# Patient Record
Sex: MALE | Race: OTHER | NOT HISPANIC OR LATINO | URBAN - METROPOLITAN AREA
[De-identification: names, ages, dates, MRNs, and addresses within clinical notes are randomized per-mention and may not be internally consistent; named-entity substitution may affect disease eponyms.]

---

## 2019-05-26 ENCOUNTER — EMERGENCY (EMERGENCY)
Facility: HOSPITAL | Age: 59
LOS: 1 days | Discharge: HOME | End: 2019-05-26
Attending: EMERGENCY MEDICINE | Admitting: EMERGENCY MEDICINE
Payer: MEDICAID

## 2019-05-26 VITALS
SYSTOLIC BLOOD PRESSURE: 121 MMHG | OXYGEN SATURATION: 99 % | TEMPERATURE: 98 F | DIASTOLIC BLOOD PRESSURE: 80 MMHG | HEART RATE: 68 BPM | RESPIRATION RATE: 18 BRPM

## 2019-05-26 VITALS
OXYGEN SATURATION: 100 % | RESPIRATION RATE: 17 BRPM | SYSTOLIC BLOOD PRESSURE: 140 MMHG | DIASTOLIC BLOOD PRESSURE: 77 MMHG | HEART RATE: 71 BPM

## 2019-05-26 LAB
ALBUMIN SERPL ELPH-MCNC: 4.3 G/DL — SIGNIFICANT CHANGE UP (ref 3.3–5)
ALP SERPL-CCNC: 78 U/L — SIGNIFICANT CHANGE UP (ref 40–120)
ALT FLD-CCNC: 23 U/L — SIGNIFICANT CHANGE UP (ref 4–41)
ANION GAP SERPL CALC-SCNC: 14 MMO/L — SIGNIFICANT CHANGE UP (ref 7–14)
APPEARANCE UR: CLEAR — SIGNIFICANT CHANGE UP
APTT BLD: 30.3 SEC — SIGNIFICANT CHANGE UP (ref 27.5–36.3)
AST SERPL-CCNC: 18 U/L — SIGNIFICANT CHANGE UP (ref 4–40)
BASOPHILS # BLD AUTO: 0.09 K/UL — SIGNIFICANT CHANGE UP (ref 0–0.2)
BASOPHILS NFR BLD AUTO: 1.4 % — SIGNIFICANT CHANGE UP (ref 0–2)
BILIRUB SERPL-MCNC: 0.5 MG/DL — SIGNIFICANT CHANGE UP (ref 0.2–1.2)
BILIRUB UR-MCNC: NEGATIVE — SIGNIFICANT CHANGE UP
BLOOD UR QL VISUAL: NEGATIVE — SIGNIFICANT CHANGE UP
BUN SERPL-MCNC: 18 MG/DL — SIGNIFICANT CHANGE UP (ref 7–23)
CALCIUM SERPL-MCNC: 9.7 MG/DL — SIGNIFICANT CHANGE UP (ref 8.4–10.5)
CHLORIDE SERPL-SCNC: 104 MMOL/L — SIGNIFICANT CHANGE UP (ref 98–107)
CO2 SERPL-SCNC: 22 MMOL/L — SIGNIFICANT CHANGE UP (ref 22–31)
COLOR SPEC: YELLOW — SIGNIFICANT CHANGE UP
CREAT SERPL-MCNC: 0.8 MG/DL — SIGNIFICANT CHANGE UP (ref 0.5–1.3)
EOSINOPHIL # BLD AUTO: 0.33 K/UL — SIGNIFICANT CHANGE UP (ref 0–0.5)
EOSINOPHIL NFR BLD AUTO: 5.2 % — SIGNIFICANT CHANGE UP (ref 0–6)
GLUCOSE SERPL-MCNC: 104 MG/DL — HIGH (ref 70–99)
GLUCOSE UR-MCNC: >1000 — HIGH
HCT VFR BLD CALC: 45.1 % — SIGNIFICANT CHANGE UP (ref 39–50)
HGB BLD-MCNC: 14.9 G/DL — SIGNIFICANT CHANGE UP (ref 13–17)
IMM GRANULOCYTES NFR BLD AUTO: 0.5 % — SIGNIFICANT CHANGE UP (ref 0–1.5)
INR BLD: 0.97 — SIGNIFICANT CHANGE UP (ref 0.88–1.17)
KETONES UR-MCNC: NEGATIVE — SIGNIFICANT CHANGE UP
LEUKOCYTE ESTERASE UR-ACNC: NEGATIVE — SIGNIFICANT CHANGE UP
LYMPHOCYTES # BLD AUTO: 2.58 K/UL — SIGNIFICANT CHANGE UP (ref 1–3.3)
LYMPHOCYTES # BLD AUTO: 41 % — SIGNIFICANT CHANGE UP (ref 13–44)
MCHC RBC-ENTMCNC: 28.2 PG — SIGNIFICANT CHANGE UP (ref 27–34)
MCHC RBC-ENTMCNC: 33 % — SIGNIFICANT CHANGE UP (ref 32–36)
MCV RBC AUTO: 85.4 FL — SIGNIFICANT CHANGE UP (ref 80–100)
MONOCYTES # BLD AUTO: 0.53 K/UL — SIGNIFICANT CHANGE UP (ref 0–0.9)
MONOCYTES NFR BLD AUTO: 8.4 % — SIGNIFICANT CHANGE UP (ref 2–14)
NEUTROPHILS # BLD AUTO: 2.74 K/UL — SIGNIFICANT CHANGE UP (ref 1.8–7.4)
NEUTROPHILS NFR BLD AUTO: 43.5 % — SIGNIFICANT CHANGE UP (ref 43–77)
NITRITE UR-MCNC: NEGATIVE — SIGNIFICANT CHANGE UP
NRBC # FLD: 0 K/UL — SIGNIFICANT CHANGE UP (ref 0–0)
PH UR: 6 — SIGNIFICANT CHANGE UP (ref 5–8)
PLATELET # BLD AUTO: 362 K/UL — SIGNIFICANT CHANGE UP (ref 150–400)
PMV BLD: 9 FL — SIGNIFICANT CHANGE UP (ref 7–13)
POTASSIUM SERPL-MCNC: 4.5 MMOL/L — SIGNIFICANT CHANGE UP (ref 3.5–5.3)
POTASSIUM SERPL-SCNC: 4.5 MMOL/L — SIGNIFICANT CHANGE UP (ref 3.5–5.3)
PROT SERPL-MCNC: 7.3 G/DL — SIGNIFICANT CHANGE UP (ref 6–8.3)
PROT UR-MCNC: NEGATIVE — SIGNIFICANT CHANGE UP
PROTHROM AB SERPL-ACNC: 10.7 SEC — SIGNIFICANT CHANGE UP (ref 9.8–13.1)
RBC # BLD: 5.28 M/UL — SIGNIFICANT CHANGE UP (ref 4.2–5.8)
RBC # FLD: 13.2 % — SIGNIFICANT CHANGE UP (ref 10.3–14.5)
SODIUM SERPL-SCNC: 140 MMOL/L — SIGNIFICANT CHANGE UP (ref 135–145)
SP GR SPEC: 1.03 — SIGNIFICANT CHANGE UP (ref 1–1.04)
TROPONIN T, HIGH SENSITIVITY: < 6 NG/L — SIGNIFICANT CHANGE UP (ref ?–14)
UROBILINOGEN FLD QL: NORMAL — SIGNIFICANT CHANGE UP
WBC # BLD: 6.3 K/UL — SIGNIFICANT CHANGE UP (ref 3.8–10.5)
WBC # FLD AUTO: 6.3 K/UL — SIGNIFICANT CHANGE UP (ref 3.8–10.5)

## 2019-05-26 PROCEDURE — 70496 CT ANGIOGRAPHY HEAD: CPT | Mod: 26

## 2019-05-26 PROCEDURE — 71046 X-RAY EXAM CHEST 2 VIEWS: CPT | Mod: 26

## 2019-05-26 PROCEDURE — 74176 CT ABD & PELVIS W/O CONTRAST: CPT | Mod: 26

## 2019-05-26 PROCEDURE — 70498 CT ANGIOGRAPHY NECK: CPT | Mod: 26

## 2019-05-26 PROCEDURE — 99285 EMERGENCY DEPT VISIT HI MDM: CPT

## 2019-05-26 RX ORDER — ASPIRIN/CALCIUM CARB/MAGNESIUM 324 MG
325 TABLET ORAL ONCE
Refills: 0 | Status: COMPLETED | OUTPATIENT
Start: 2019-05-26 | End: 2019-05-26

## 2019-05-26 RX ADMIN — Medication 325 MILLIGRAM(S): at 21:22

## 2019-05-26 NOTE — ED PROVIDER NOTE - PROGRESS NOTE DETAILS
Neurology consulted, seeing patient now. Gregory: neuro rec appreciated. cta head and neck- neg.   still having neuropathy likely peripheral but need to r/o cva  admit to cdu for left sided paresthesia r/o cva. asa given Gregory: pt went over to CDU and refused to be in cdu due to Ramadan.  pt neurologically intact.    dx paresthesia.  dc with neuro follow up and pcp. return if symptoms changes or worsens. left ambulatory.  return precautions given.

## 2019-05-26 NOTE — ED PROVIDER NOTE - PHYSICAL EXAMINATION
Gen:  NAD, alert and Oriented  HEENT: Gen:  NAD, alert and Oriented  HEENT:  PERRL, EOMI  Heart:  RRR, no M/R/G  Lung:  CTA b/l, no rales or wheeze  Abd:  Right lateral abdominal tenderness, right CVA tenderness, soft, Obese  Ext:  No edema or joint swelling  Skin:  No rash or ecchymosis  Neuro:  Slight decreased sensation in left V2-distribuation of CN V, otherwise CN II-XII intact.  Decreased sensation left arm and leg.   and arm strength 5/5, left hip flexion strength 4+/5.  Finger to nose intact. Gen:  NAD, alert and Oriented  HEENT:  PERRL, EOMI  Heart:  RRR, no M/R/G  Lung:  CTA b/l, no rales or wheeze  Abd:  Right lateral abdominal tenderness, right CVA tenderness, soft, Obese  Ext:  No edema or joint swelling  Skin:  No rash or ecchymosis.  Radial and DP pulses 2+b/l  Neuro:  Slight decreased sensation in left V2-distribuation of CN V, otherwise CN II-XII intact.  Decreased sensation left arm and leg.   and arm strength 5/5, left hip flexion strength 4+/5.  Finger to nose intact.

## 2019-05-26 NOTE — ED ADULT NURSE REASSESSMENT NOTE - NS ED NURSE REASSESS COMMENT FT1
report received from RN Braxton, pt resting comfortably in stretcher, denies CP/SOB/numbness/tingling, VSS, pt awaiting CT head, will monitor

## 2019-05-26 NOTE — CONSULT NOTE ADULT - ASSESSMENT
58y Male PMH DM, HLD p/w L hemisensory symptoms. NIHSS 1, MRS 0. LLE sensory dysfunction of unknown mechanism; patient without UMN signs; in my judgment, CNS process such as stroke less likely at this time; radiculopathy less likely as patient without motor symptoms and sensory disturbance not following dermatomal distribution. Would consider peripheral neuropathy workup at this time; can follow up outpatient for remainder of sensory dysfunction workup.     Recs:  B12, MMA, TSH, B1, B9, B6, Hba1c, ESR, CRP

## 2019-05-26 NOTE — ED PROVIDER NOTE - CLINICAL SUMMARY MEDICAL DECISION MAKING FREE TEXT BOX
Unusual constellation of neurological and abdominal complaints.  Last known neurological normal 48 hours ago.  CT head, CT abd - stone [facility]lee, CXR, UA, labs, neuro consult.

## 2019-05-26 NOTE — ED PROVIDER NOTE - OBJECTIVE STATEMENT
58 y.o. PMH DM, HLD p/w left abdominal/flank pain since yesterday, and feeling that his left arm and left leg are asleep x couple days.  He woke up this morning feeling that his left arm and leg are more asleep.  He feels entire body tiredness, but no lateralized weakness on the left.  His left face, left arm, and left leg feel numbness.  No change in vision.  No chest pain.  He felt fevers yesterday.  He is fasting for ramadan, decreased appetite at night. S/p right inguinal hernia repair 20 years ago.  No vomiting, no diarrhea, no melena or hematochezia. 58 y.o. PMH DM, HLD p/w Right abdominal/flank pain since yesterday, and feeling that his left arm and left leg are asleep x couple days.  He woke up this morning feeling that his left arm and leg are more asleep.  He feels entire body tiredness, but no lateralized weakness on the left.  His left face, left arm, and left leg feel numbness.  No change in vision.  No chest pain.  He felt fevers yesterday.  He is fasting for ramadan, decreased appetite at night. S/p right inguinal hernia repair 20 years ago.  No vomiting, no diarrhea, no melena or hematochezia.

## 2019-05-26 NOTE — ED ADULT NURSE NOTE - CHIEF COMPLAINT QUOTE
c/o left side numbness after waking up this morning and right flank pain, PERRLA extremities are strong and equal, evaluated by Dr. Amanda, no code stroke at this time.   PMHX: DM

## 2019-05-26 NOTE — ED PROVIDER NOTE - ATTENDING CONTRIBUTION TO CARE
I was physically present for the E/M service provided. I agree with above history, physical, and plan which I have reviewed and edited where appropriate. I was physically present for the key portions of the service provided.    Bri: 58 yr old male with hx of Dm, HLD and inguinal hernia repair presents to ed c/o right flank pain worse with movement since today along with left sided numbness and tingling x couple days.  sub fever, + chills, no visual changes, no headache, no sob, no cough, no cp, no palpitations, no leg swelling, no syncope, no abd pain, no n/v/d, no dysuria, no rashes.      *GEN:   comfortable, in no apparent distress, AOx3  *EYES:   PERRL, extra-occular movements intact  *HEENT:   airway patent, moist mucosal membranes, uvula midline  *CV:   regular rate and rhythm, normal S1/S2, no murmur  *RESP:   clear to auscultation bilaterally, non-labored, speaking in full sentences  *ABD:   soft, non tender, no guarding  *:   + cva tenderness  *MSK:   no musculoskeletal tenderness, 5/5 strength except LLE 4/5, moving all extremity  *SKIN:   dry, intact, no rash  *NEURO:   AOx3, no focal weakness or loss of sensation, gait normal, GCS 15, CN II-XII, normal finger to nose, no meningeal sign     a/p: flank pain r/o kidney stone vs pyelo.  numbness and tingling of left side possibly cva vs polyneuropathy.  labs, cta head and neck, ua, re-assess

## 2019-05-26 NOTE — ED ADULT TRIAGE NOTE - CHIEF COMPLAINT QUOTE
c/o left side numbness after waking up this morning and right flank pain, PERRLA extremities are strong and equal, FIT in triage.   PMHX: DM c/o left side numbness after waking up this morning and right flank pain, PERRLA extremities are strong and equal, evaluated by Dr. Amanda, no code stroke at this time.   PMHX: DM

## 2019-05-26 NOTE — CONSULT NOTE ADULT - SUBJECTIVE AND OBJECTIVE BOX
Neurology Consult    58y Male PMH DM, HLD p/w L hemisensory symptoms. LKW 2200. As per patient, has had R flank pain since ; went to bed. Woke up w/ LLE numbness, tingling which persisted throughout the day. Denies focal weakness, vision changes, dysarthria, dysphagia, gait instability. As per patient, has been fasting for Ramadan.     REVIEW OF SYSTEMS:  As above    MEDICATIONS    PMH:      PSH:     FAMILY HISTORY:    No history of dementia, strokes, or seizures     SOCIAL HISTORY:  No history of tobacco or alcohol use     Allergies  No Known Allergies    Intolerances    Vital Signs Last 24 Hrs  T(C): 36.8 (26 May 2019 15:54), Max: 36.9 (26 May 2019 15:03)  T(F): 98.2 (26 May 2019 15:54), Max: 98.4 (26 May 2019 15:03)  HR: 61 (26 May 2019 15:54) (61 - 68)  BP: 136/87 (26 May 2019 15:54) (121/80 - 136/87)  BP(mean): --  RR: 20 (26 May 2019 15:54) (18 - 20)  SpO2: 98% (26 May 2019 15:54) (98% - 99%)    Neurological Examination:    Mental Status: Patient is alert, awake, oriented X3. Patient is fluent, no dysarthria, no aphasia. Follows commands well and able to answer questions appropriately. Mood and affect normal.    Cranial Nerves: PERRL, EOMI, visual field intact, V1-V3 intact, no gross facial asymmetry, tongue/uvula midline    Motor Exam: No drift  Right upper extremity: 5/5  Left upper extremity: 5/5  Right lower extremity: 5/5  Left lower extremity: 5/5 hip flex, knee flex/ext, dorsi, plantar, inversion    Normal bulk/tone    Sensory: dec to light touch over anterior aspect of leg not in any dermatomal distribution to my eye. Increased sensitivity to temp, pain in LLE; vibration > 10 secs BLE on 1st digit, proprioception intact    Coordination: Finger to nose intact bilaterally     Reflexes: Bilateral 2+ Biceps, Brachial, Patellar, Ankle  Plantar: Equivocal bilateral    GENERAL: No acute distress  HEENT:  Normocephalic, atraumatic  EXTREMITIES: No edema, clubbing, cyanosis  MUSCULOSKELETAL: Normal range of motion  SKIN: No rashes    LABS:  CBC Full  -  ( 26 May 2019 15:42 )  WBC Count : 6.30 K/uL  RBC Count : 5.28 M/uL  Hemoglobin : 14.9 g/dL  Hematocrit : 45.1 %  Platelet Count - Automated : 362 K/uL  Mean Cell Volume : 85.4 fL  Mean Cell Hemoglobin : 28.2 pg  Mean Cell Hemoglobin Concentration : 33.0 %  Auto Neutrophil # : 2.74 K/uL  Auto Lymphocyte # : 2.58 K/uL  Auto Monocyte # : 0.53 K/uL  Auto Eosinophil # : 0.33 K/uL  Auto Basophil # : 0.09 K/uL  Auto Neutrophil % : 43.5 %  Auto Lymphocyte % : 41.0 %  Auto Monocyte % : 8.4 %  Auto Eosinophil % : 5.2 %  Auto Basophil % : 1.4 %    Urinalysis Basic - ( 26 May 2019 16:50 )    Color: YELLOW / Appearance: CLEAR / S.029 / pH: 6.0  Gluc: >1000 / Ketone: NEGATIVE  / Bili: NEGATIVE / Urobili: NORMAL   Blood: NEGATIVE / Protein: NEGATIVE / Nitrite: NEGATIVE   Leuk Esterase: NEGATIVE / RBC: x / WBC x   Sq Epi: x / Non Sq Epi: x / Bacteria: x      -    140  |  104  |  18  ----------------------------<  104<H>  4.5   |  22  |  0.80    Ca    9.7      26 May 2019 15:42    TPro  7.3  /  Alb  4.3  /  TBili  0.5  /  DBili  x   /  AST  18  /  ALT  23  /  AlkPhos  78  05-26    LIVER FUNCTIONS - ( 26 May 2019 15:42 )  Alb: 4.3 g/dL / Pro: 7.3 g/dL / ALK PHOS: 78 u/L / ALT: 23 u/L / AST: 18 u/L / GGT: x           Hemoglobin A1C:       PT/INR - ( 26 May 2019 15:42 )   PT: 10.7 SEC;   INR: 0.97          PTT - ( 26 May 2019 15:42 )  PTT:30.3 SEC    RADIOLOGY:  CT head:  MRI:

## 2019-05-26 NOTE — ED ADULT NURSE NOTE - OBJECTIVE STATEMENT
Pt received in #4, aaox3 with c/o non-traumatic, constant, 6/10 pain to the lateral/medial aspect of his right middle ribs and "my left side feels sleepy". Pt states that he noticed his symptoms at ~0800 this morning. Pt denies sob, nausea, vomiting, dizziness, diaphoresis, fever, chills, unsteady gait, ams, alterations to vision. Pt on CM in SR with continuous pulse oximetry, iv established, labs sent.

## 2019-05-28 LAB
BACTERIA UR CULT: SIGNIFICANT CHANGE UP
SPECIMEN SOURCE: SIGNIFICANT CHANGE UP

## 2023-07-18 NOTE — ED PROVIDER NOTE - DATE/TIME 2
26-May-2019 21:35
Pt performed the TUG 3x (1st trial: 37.46 seconds, 2nd trial: 37.41 seconds and 3rd trial: 31.20 seconds). Pt demonstrated wide KAIT, dec heel strike bilaterally, dec arm swing on L, dec step height, dec andressa, dec toe off bilaterally, dec eccentric control for transfers and dec balance. Pt took average 6 steps for turns and average 35.36 seconds to complete the TUG.